# Patient Record
Sex: MALE | Race: WHITE | NOT HISPANIC OR LATINO | Employment: FULL TIME | URBAN - METROPOLITAN AREA
[De-identification: names, ages, dates, MRNs, and addresses within clinical notes are randomized per-mention and may not be internally consistent; named-entity substitution may affect disease eponyms.]

---

## 2017-01-04 DIAGNOSIS — N52.9 ERECTILE DYSFUNCTION, UNSPECIFIED ERECTILE DYSFUNCTION TYPE: Primary | ICD-10-CM

## 2017-01-04 NOTE — TELEPHONE ENCOUNTER
Patient would like to get generic Sildenafil 20mg tabs (#30 tabs = $74.50)  (#12 Viagra tabs = $453.31)

## 2017-01-05 RX ORDER — SILDENAFIL CITRATE 20 MG/1
20 TABLET ORAL
Qty: 90 TABLET | Refills: 0 | Status: SHIPPED
Start: 2017-01-05 | End: 2017-07-12

## 2017-07-12 ENCOUNTER — OFFICE VISIT (OUTPATIENT)
Dept: FAMILY MEDICINE | Facility: CLINIC | Age: 42
End: 2017-07-12
Payer: COMMERCIAL

## 2017-07-12 VITALS
SYSTOLIC BLOOD PRESSURE: 130 MMHG | DIASTOLIC BLOOD PRESSURE: 83 MMHG | TEMPERATURE: 99 F | BODY MASS INDEX: 37.73 KG/M2 | HEART RATE: 86 BPM | WEIGHT: 221 LBS | HEIGHT: 64 IN

## 2017-07-12 DIAGNOSIS — N52.9 ERECTILE DYSFUNCTION, UNSPECIFIED ERECTILE DYSFUNCTION TYPE: ICD-10-CM

## 2017-07-12 DIAGNOSIS — E66.09 NON MORBID OBESITY DUE TO EXCESS CALORIES: ICD-10-CM

## 2017-07-12 DIAGNOSIS — M77.12 LATERAL EPICONDYLITIS OF LEFT ELBOW: ICD-10-CM

## 2017-07-12 DIAGNOSIS — Z00.00 ROUTINE GENERAL MEDICAL EXAMINATION AT A HEALTH CARE FACILITY: Primary | ICD-10-CM

## 2017-07-12 DIAGNOSIS — R53.83 FATIGUE, UNSPECIFIED TYPE: ICD-10-CM

## 2017-07-12 DIAGNOSIS — G47.33 OBSTRUCTIVE SLEEP APNEA SYNDROME: ICD-10-CM

## 2017-07-12 DIAGNOSIS — R73.9 ELEVATED RANDOM BLOOD GLUCOSE LEVEL: ICD-10-CM

## 2017-07-12 LAB
ERYTHROCYTE [DISTWIDTH] IN BLOOD BY AUTOMATED COUNT: 13.2 % (ref 10–15)
HBA1C MFR BLD: 5.2 % (ref 4.3–6)
HCT VFR BLD AUTO: 43.7 % (ref 40–53)
HGB BLD-MCNC: 14.6 G/DL (ref 13.3–17.7)
MCH RBC QN AUTO: 29.8 PG (ref 26.5–33)
MCHC RBC AUTO-ENTMCNC: 33.4 G/DL (ref 31.5–36.5)
MCV RBC AUTO: 89 FL (ref 78–100)
PLATELET # BLD AUTO: 296 10E9/L (ref 150–450)
RBC # BLD AUTO: 4.9 10E12/L (ref 4.4–5.9)
WBC # BLD AUTO: 7.4 10E9/L (ref 4–11)

## 2017-07-12 PROCEDURE — 36415 COLL VENOUS BLD VENIPUNCTURE: CPT | Performed by: FAMILY MEDICINE

## 2017-07-12 PROCEDURE — 85027 COMPLETE CBC AUTOMATED: CPT | Performed by: FAMILY MEDICINE

## 2017-07-12 PROCEDURE — 99396 PREV VISIT EST AGE 40-64: CPT | Performed by: FAMILY MEDICINE

## 2017-07-12 PROCEDURE — 84443 ASSAY THYROID STIM HORMONE: CPT | Performed by: FAMILY MEDICINE

## 2017-07-12 PROCEDURE — 83036 HEMOGLOBIN GLYCOSYLATED A1C: CPT | Performed by: FAMILY MEDICINE

## 2017-07-12 RX ORDER — SILDENAFIL CITRATE 20 MG/1
20 TABLET ORAL
Qty: 90 TABLET | Refills: 0 | Status: SHIPPED | OUTPATIENT
Start: 2017-07-12 | End: 2017-07-12

## 2017-07-12 RX ORDER — SILDENAFIL CITRATE 20 MG/1
20 TABLET ORAL
Qty: 30 TABLET | Refills: 11 | Status: SHIPPED | OUTPATIENT
Start: 2017-07-12 | End: 2018-09-10

## 2017-07-12 NOTE — PATIENT INSTRUCTIONS
Weight Loss:  Exercise - the most important variable in weight loss.  Studies show that people who are able to lose weight and keep it off do 3-6 hours/week of aerobic exercise.  This is a lot if you time it but it's necessary.  In addition to intense activity, frequent short walks can help too. If you can get up and walk for 100 seconds every 30 minutes, that helps lower blood sugar. Try to find a number of activities that you enjoy so that you are not limited by circumstances. Build it into your day. For trips less than a mile, walk.  For less than 3 miles, bike. Park as far away as possible.   Spread out your calories.  Don't skip meals.  Skipping meals tells your metabolism that food is not readily available and your body should go into 'storage mode' which reduces energy and puts all available calories into fat.  Eat foods with a low glycemic index (GI).  These are foods that turn to blood sugar slower.  These prevent hunger and help eliminate wide swings in blood sugar which can cause mood and appetite swings.  Low fat proteins, nuts and whole grains often have a low GI and are good.  Simple sugars such that found in white rice, baked potatoes and white pasta/bread have a higher glycemic index and should be limited. Download a free dianne for your smart phone.  Go to bed calorie-deprived.  Consider drinking a large glass of psyllium (Metamucil) or methylcellulose (Citrucel) prior to larger meals such as dinner.  This will help fill your stomach better and longer than water which will help reduce appetite. Don't worry, it won't cause diarrhea.  Use smaller plates. It sounds silly but people who get rid of their large plates and only eat off the smaller ones lose weight!  Plan ahead. You always make better decisions ahead of time so plan and prepare your future meal(s) early including portion size. Reserve only healthy food like fruits and veggies for spur of the minute eating  Last, but not least, pay special  attention to your emotional state.  Most maladaptive behavior is done so out of an attempt to sooth one's anxieties and/or regenerate emotional energy.  Eating, like other pleasurable activities, releases dopamine in the pleasure centers of your brain.  This helps counteract the neurological effects of stress but, only briefly.  The dopamine wears off quickly, leaving one with a more empty feeling shortly after.  When you find yourself eating more than you want (or more often), ask yourself what's making you anxious, sad or angry.  Addressing these issues more directly will help minimize 'stress-eating'.     Do the exercises for the forearms and if getting worse or not getting gbeter over the next month, let me knwo and we can do physical therapy.   Preventive Health Recommendations  Male Ages 40 to 49    Yearly exam:             See your health care provider every year in order to  o   Review health changes.   o   Discuss preventive care.    o   Review your medicines if your doctor has prescribed any.    You should be tested each year for STDs (sexually transmitted diseases) if you re at risk.     Have a cholesterol test every 5 years.     Have a colonoscopy (test for colon cancer) if someone in your family has had colon cancer or polyps before age 50.     After age 45, have a diabetes test (fasting glucose). If you are at risk for diabetes, you should have this test every 3 years.      Talk with your health care provider about whether or not a prostate cancer screening test (PSA) is right for you.    Shots: Get a flu shot each year. Get a tetanus shot every 10 years.     Nutrition:    Eat at least 5 servings of fruits and vegetables daily.     Eat whole-grain bread, whole-wheat pasta and brown rice instead of white grains and rice.     Talk to your provider about Calcium and Vitamin D.     Lifestyle    Exercise for at least 150 minutes a week (30 minutes a day, 5 days a week). This will help you control your  weight and prevent disease.     Limit alcohol to one drink per day.     No smoking.     Wear sunscreen to prevent skin cancer.     See your dentist every six months for an exam and cleaning.

## 2017-07-12 NOTE — MR AVS SNAPSHOT
After Visit Summary   7/12/2017    Paco Yang    MRN: [id number]           Patient Information     Date Of Birth          1975        Visit Information        Provider Department      7/12/2017 4:00 PM Ruperto Dyer MD [facility]        Today's Diagnoses     Routine general medical examination at a health care facility    -  1    Erectile dysfunction, unspecified erectile dysfunction type        Elevated random blood glucose level        Obstructive sleep apnea syndrome        Non morbid obesity due to excess calories        Fatigue, unspecified type        Lateral epicondylitis of left elbow          Care Instructions    Weight Loss:  Exercise - the most important variable in weight loss.  Studies show that people who are able to lose weight and keep it off do 3-6 hours/week of aerobic exercise.  This is a lot if you time it but it's necessary.  In addition to intense activity, frequent short walks can help too. If you can get up and walk for 100 seconds every 30 minutes, that helps lower blood sugar. Try to find a number of activities that you enjoy so that you are not limited by circumstances. Build it into your day. For trips less than a mile, walk.  For less than 3 miles, bike. Park as far away as possible.   Spread out your calories.  Don't skip meals.  Skipping meals tells your metabolism that food is not readily available and your body should go into 'storage mode' which reduces energy and puts all available calories into fat.  Eat foods with a low glycemic index (GI).  These are foods that turn to blood sugar slower.  These prevent hunger and help eliminate wide swings in blood sugar which can cause mood and appetite swings.  Low fat proteins, nuts and whole grains often have a low GI and are good.  Simple sugars such that found in white rice, baked potatoes and white pasta/bread have a higher glycemic index and should be limited. Download a free dianne for your  smart phone.  Go to bed calorie-deprived.  Consider drinking a large glass of psyllium (Metamucil) or methylcellulose (Citrucel) prior to larger meals such as dinner.  This will help fill your stomach better and longer than water which will help reduce appetite. Don't worry, it won't cause diarrhea.  Use smaller plates. It sounds silly but people who get rid of their large plates and only eat off the smaller ones lose weight!  Plan ahead. You always make better decisions ahead of time so plan and prepare your future meal(s) early including portion size. Reserve only healthy food like fruits and veggies for spur of the minute eating  Last, but not least, pay special attention to your emotional state.  Most maladaptive behavior is done so out of an attempt to sooth one's anxieties and/or regenerate emotional energy.  Eating, like other pleasurable activities, releases dopamine in the pleasure centers of your brain.  This helps counteract the neurological effects of stress but, only briefly.  The dopamine wears off quickly, leaving one with a more empty feeling shortly after.  When you find yourself eating more than you want (or more often), ask yourself what's making you anxious, sad or angry.  Addressing these issues more directly will help minimize 'stress-eating'.     Do the exercises for the forearms and if getting worse or not getting gbeter over the next month, let me knwo and we can do physical therapy.   Preventive Health Recommendations  Male Ages 40 to 49    Yearly exam:             See your health care provider every year in order to  o   Review health changes.   o   Discuss preventive care.    o   Review your medicines if your doctor has prescribed any.    You should be tested each year for STDs (sexually transmitted diseases) if you re at risk.     Have a cholesterol test every 5 years.     Have a colonoscopy (test for colon cancer) if someone in your family has had colon cancer or polyps before age 50.      After age 45, have a diabetes test (fasting glucose). If you are at risk for diabetes, you should have this test every 3 years.      Talk with your health care provider about whether or not a prostate cancer screening test (PSA) is right for you.    Shots: Get a flu shot each year. Get a tetanus shot every 10 years.     Nutrition:    Eat at least 5 servings of fruits and vegetables daily.     Eat whole-grain bread, whole-wheat pasta and brown rice instead of white grains and rice.     Talk to your provider about Calcium and Vitamin D.     Lifestyle    Exercise for at least 150 minutes a week (30 minutes a day, 5 days a week). This will help you control your weight and prevent disease.     Limit alcohol to one drink per day.     No smoking.     Wear sunscreen to prevent skin cancer.     See your dentist every six months for an exam and cleaning.              Follow-ups after your visit        Additional Services     SLEEP EVALUATION & MANAGEMENT REFERRAL - ADULT       Fatigue.  Wants to know if setting appropriate. Please be aware that coverage of these services is subject to the terms and limitations of your health insurance plan.  Call member services at your health plan with any benefit or coverage questions.      Please bring the following to your appointment:    >>   List of current medications   >>   This referral request   >>   Any documents/labs given to you for this referral    [facility]  Ph [phone] (Age 2 and up)                  Future tests that were ordered for you today     Open Future Orders        Priority Expected Expires Ordered    SLEEP EVALUATION & MANAGEMENT REFERRAL - ADULT Routine  7/12/2018 7/12/2017    Lipid panel reflex to direct LDL Routine 7/13/2017 7/12/2018 7/12/2017            Who to contact     Normal or non-critical lab and imaging results will be communicated to you by MyChart, letter or phone within 4 business days after the clinic has received the  "results. If you do not hear from us within 7 days, please contact the clinic through [employer] or phone. If you have a critical or abnormal lab result, we will notify you by phone as soon as possible.  Submit refill requests through [employer] or call your pharmacy and they will forward the refill request to us. Please allow 3 business days for your refill to be completed.          If you need to speak with a  for additional information , please call: [phone]           Additional Information About Your Visit        [employer] Information     [employer] gives you secure access to your electronic health record. If you see a primary care provider, you can also send messages to your care team and make appointments. If you have questions, please call your primary care clinic.  If you do not have a primary care provider, please call [phone] and they will assist you.        Care EveryWhere ID     This is your Care EveryWhere ID. This could be used by other organizations to access your [facility] medical records  [id number]        Your Vitals Were     Pulse Temperature Height BMI (Body Mass Index)          86 99  F (37.2  C) (Tympanic) 5' 4\" (1.626 m) 37.93 kg/m2         Blood Pressure from Last 3 Encounters:   07/12/17 130/83   09/26/16 130/86   12/11/15 131/87    Weight from Last 3 Encounters:   07/12/17 221 lb (100.2 kg)   09/26/16 213 lb (96.6 kg)   12/11/15 214 lb 9.6 oz (97.3 kg)              We Performed the Following     CBC with platelets     Hemoglobin A1c     OFFICE/OUTPT VISIT,EST,LEVL III     TSH          Today's Medication Changes          These changes are accurate as of: 7/12/17  4:44 PM.  If you have any questions, ask your nurse or doctor.               Start taking these medicines.        Dose/Directions    sildenafil 20 MG tablet   Commonly known as:  REVATIO/VIAGRA   Used for:  Erectile dysfunction, unspecified erectile dysfunction type   Started by:  Ruperto Dyer MD        Dose:  " 20 mg   Take 1 tablet (20 mg) by mouth once as needed 1-2 as needed. Never use with nitroglycerin, terazosin or doxazosin.   Quantity:  30 tablet   Refills:  11            Where to get your medicines      These medications were sent to [facility] [place] [id number] [facility]  [address] [facility] [address]     Phone:  [phone]     sildenafil 20 MG tablet                Primary Care Provider Office Phone # Fax #    Ruperto Dyer [phone]       [address] [address]        Equal Access to Services     SUNDEEP ARCHULEAT : Hadii aad ku hadasho Soomaali, waaxda luqadaha, qaybta kaalmada adeegyada, danica valero . So [facility] [phone].    ATENCIÓN: Si habla español, tiene a galan disposición servicios gratuitos de asistencia lingüística. Llame al [phone].    We comply with applicable federal civil rights laws and [place] laws. We do not discriminate on the basis of race, color, national origin, age, disability sex, sexual orientation or gender identity.            Thank you!     Thank you for choosing [facility]  for your care. Our goal is always to provide you with excellent care. Hearing back from our patients is one way we can continue to improve our services. Please take a few minutes to complete the written survey that you may receive in the mail after your visit with us. Thank you!             Your Updated Medication List - Protect others around you: Learn how to safely use, store and throw away your medicines at www.disposemymeds.org.          This list is accurate as of: 7/12/17  4:44 PM.  Always use your most recent med list.                   Brand Name Dispense Instructions for use Diagnosis    ibuprofen 800 MG tablet    ADVIL/MOTRIN     Take 800 mg by mouth every 6 hours as needed for moderate pain        order for DME     1 Device    Equipment being ordered:  cpap mask and tubing. Replacement.    Unspecified sleep apnea       sildenafil 20 MG tablet    REVATIO/VIAGRA    30 tablet    Take 1 tablet (20 mg) by mouth once as needed 1-2 as needed. Never use with nitroglycerin, terazosin or doxazosin.    Erectile dysfunction, unspecified erectile dysfunction type

## 2017-07-12 NOTE — PROGRESS NOTES
SUBJECTIVE:   CC: Paco Yang is an 41 year old male who presents for preventative health visit.     Healthy Habits:    Do you get at least three servings of calcium containing foods daily (dairy, green leafy vegetables, etc.)? yes    Amount of exercise or daily activities, outside of work: 0-2 day(s) per week    Problems taking medications regularly No    Medication side effects: No    Have you had an eye exam in the past two years? no    Do you see a dentist twice per year? no    Do you have sleep apnea, excessive snoring or daytime drowsiness?yes        PROBLEMS TO ADD ON...  1. Routine general medical examination at a health care facility    2. Erectile dysfunction, unspecified erectile dysfunction type    3. Elevated random blood glucose level    4. Obstructive sleep apnea syndrome    5. Non morbid obesity due to excess calories    6. Fatigue, unspecified type    7. Lateral epicondylitis of left elbow         Today's PHQ-2 Score:   PHQ-2 ( 1999 Pfizer) 7/12/2017 9/26/2016   Q1: Little interest or pleasure in doing things 0 0   Q2: Feeling down, depressed or hopeless 0 0   PHQ-2 Score 0 0       Abuse: Current or Past(Physical, Sexual or Emotional)- No  Do you feel safe in your environment - Yes    Social History   Substance Use Topics     Smoking status: Never Smoker     Smokeless tobacco: Former User     Types: Chew     Quit date: 3/3/2004      Comment: Chews 1 tin q 2     Alcohol use Yes      Comment: 10 drinks per week     The patient does not drink >3 drinks per day nor >7 drinks per week.    Last PSA: No results found for: PSA    Reviewed orders with patient. Reviewed health maintenance and updated orders accordingly - Yes      Reviewed and updated as needed this visit by clinical staff  Tobacco  Allergies  Med Hx  Surg Hx  Fam Hx  Soc Hx        Reviewed and updated as needed this visit by Provider        No results found for: A1C   Lab Results   Component Value Date     12/11/2015  "    Left arm pain with actiivty. Does work as a . Lots of gripping and graspign.      Tired a lot.  Sleeping more.  Using cpap.     ROS:   Constitutional, neuro, EMT, endocrine, pulmonary, cardiac, gastrointestinal, genitourinary, musculoskeletal, integument and psychiatric systems are otherwise negative.     OBJECTIVE:   /83  Pulse 86  Temp 99  F (37.2  C) (Tympanic)  Ht 5' 4\" (1.626 m)  Wt 221 lb (100.2 kg)  BMI 37.93 kg/m2  EXAM:  GENERAL: healthy, alert and no distress  NECK: no adenopathy, no asymmetry, masses, or scars and thyroid normal to palpation  RESP: lungs clear to auscultation - no rales, rhonchi or wheezes  CV: regular rate and rhythm, normal S1 S2, no S3 or S4, no murmur, click or rub, no peripheral edema and peripheral pulses strong  ABDOMEN: soft, nontender, no hepatosplenomegaly, no masses and bowel sounds normal  MS: no gross musculoskeletal defects noted, no edema  NEURO: Normal strength and tone, mentation intact and speech normal  PSYCH: mentation appears normal, affect normal/bright    ASSESSMENT/PLAN:       ICD-10-CM    1. Routine general medical examination at a health care facility Z00.00 Lipid panel reflex to direct LDL   2. Erectile dysfunction, unspecified erectile dysfunction type N52.9 sildenafil (REVATIO/VIAGRA) 20 MG tablet     DISCONTINUED: sildenafil (REVATIO/VIAGRA) 20 MG tablet   3. Elevated random blood glucose level R73.09    4. Obstructive sleep apnea syndrome G47.33 SLEEP EVALUATION & MANAGEMENT REFERRAL - ADULT   5. Non morbid obesity due to excess calories E66.09 Hemoglobin A1c     TSH     CBC with platelets     SLEEP EVALUATION & MANAGEMENT REFERRAL - ADULT   6. Fatigue, unspecified type R53.83    7. Lateral epicondylitis of left elbow M77.12        COUNSELING:  Reviewed preventive health counseling, as reflected in patient instructions    BP Screening:   Last 3 BP Readings:    BP Readings from Last 3 Encounters:   07/12/17 130/83   09/26/16 130/86 " "  12/11/15 131/87       The following was recommended to the patient:  Re-screen BP within a year and recommended lifestyle modifications     reports that he has never smoked. He quit smokeless tobacco use about 13 years ago. His smokeless tobacco use included Chew.    Estimated body mass index is 37.93 kg/(m^2) as calculated from the following:    Height as of this encounter: 5' 4\" (1.626 m).    Weight as of this encounter: 221 lb (100.2 kg).   Weight management plan: Discussed healthy diet and exercise guidelines and patient will follow up in 12 months in clinic to re-evaluate.    Counseling Resources:  ATP IV Guidelines  Pooled Cohorts Equation Calculator  FRAX Risk Assessment  ICSI Preventive Guidelines  Dietary Guidelines for Americans, 2010  USDA's MyPlate  ASA Prophylaxis  Lung CA Screening    Ruperto Dyer MD  [facility]  "

## 2017-07-12 NOTE — NURSING NOTE
"Initial /83  Pulse 86  Temp 99  F (37.2  C) (Tympanic)  Ht 5' 4\" (1.626 m)  Wt 221 lb (100.2 kg)  BMI 37.93 kg/m2 Estimated body mass index is 37.93 kg/(m^2) as calculated from the following:    Height as of this encounter: 5' 4\" (1.626 m).    Weight as of this encounter: 221 lb (100.2 kg). .    Trixie Curry CMA ([facility])  "

## 2017-07-13 LAB — TSH SERPL DL<=0.05 MIU/L-ACNC: 1.44 MU/L (ref 0.4–4)

## 2017-07-14 NOTE — PROGRESS NOTES
Mr. Yang,    Your A1c was within the goal range for your. A normal level is below 6.     The goal for diabetics with other complications is less than 8. This suggests that you do not have diabetes.    Your TSH indicates that your thyroid function is currently in balance.  No additional testing is necessary for a year or unless you develop symptoms of over or underactive thyroid.    Your blood count was normal with no evidence of anemia or white count elevation.      Please contact the clinic if you have additional questions.  Thank you.    Sincerely,    Jhonny Dyer MD

## 2018-05-23 ENCOUNTER — OFFICE VISIT (OUTPATIENT)
Dept: FAMILY MEDICINE | Facility: CLINIC | Age: 43
End: 2018-05-23
Payer: COMMERCIAL

## 2018-05-23 VITALS
RESPIRATION RATE: 16 BRPM | SYSTOLIC BLOOD PRESSURE: 122 MMHG | HEART RATE: 82 BPM | TEMPERATURE: 98.3 F | BODY MASS INDEX: 35.2 KG/M2 | HEIGHT: 64 IN | DIASTOLIC BLOOD PRESSURE: 76 MMHG | WEIGHT: 206.2 LBS

## 2018-05-23 DIAGNOSIS — G44.209 TENSION HEADACHE: ICD-10-CM

## 2018-05-23 DIAGNOSIS — N52.9 ERECTILE DYSFUNCTION, UNSPECIFIED ERECTILE DYSFUNCTION TYPE: ICD-10-CM

## 2018-05-23 DIAGNOSIS — M79.672 PAIN IN BOTH FEET: Primary | ICD-10-CM

## 2018-05-23 DIAGNOSIS — M79.671 PAIN IN BOTH FEET: Primary | ICD-10-CM

## 2018-05-23 DIAGNOSIS — G56.03 BILATERAL CARPAL TUNNEL SYNDROME: ICD-10-CM

## 2018-05-23 DIAGNOSIS — G47.9 SLEEP DISTURBANCE: ICD-10-CM

## 2018-05-23 PROCEDURE — 99214 OFFICE O/P EST MOD 30 MIN: CPT | Performed by: NURSE PRACTITIONER

## 2018-05-23 ASSESSMENT — ENCOUNTER SYMPTOMS
NUMBNESS: 0
SLEEP DISTURBANCE: 1
PALPITATIONS: 0
TREMORS: 0
ABDOMINAL PAIN: 0
SHORTNESS OF BREATH: 0
HEADACHES: 1
COUGH: 0
DIZZINESS: 0
SINUS PRESSURE: 0
WHEEZING: 0
LIGHT-HEADEDNESS: 0
JOINT SWELLING: 0
FREQUENCY: 0
CONSTIPATION: 0
SORE THROAT: 0
DYSURIA: 0
ARTHRALGIAS: 0
FATIGUE: 0
RHINORRHEA: 0
DIARRHEA: 0

## 2018-05-23 ASSESSMENT — PAIN SCALES - GENERAL: PAINLEVEL: NO PAIN (1)

## 2018-05-23 NOTE — PROGRESS NOTES
SUBJECTIVE:   Paco Yang is a 42 year old male who presents to clinic today for the following health issues:      Headaches      Duration: 2 weeks-thinks stress related. Children are acting out because he got back with an old girlfriend after his divorce. He is fighting with his ex wife. Having a hard time sleeping. Losing weight due to stress and lack of appetite.     Description  Location: front and back of head   Character: throbbing pain  Frequency:  4 headaches over the past 2 weeks  Duration:  3-4 hours    Intensity:  severe    Accompanying signs and symptoms:    Precipitating or Alleviating factors:  Nausea/vomiting: no  Dizziness: occasionally  Weakness or numbness: Hands go numb when he uses them a lot. This has been happening for years.  Visual changes: tunnel vision once  Fever: no   Sinus or URI symptoms no     History  Head trauma: no   Family history of migraines: no   Previous tests for headaches: no   Neurologist evaluations: no   Able to do daily activities when headache present: no   Wake with headaches: no   Daily pain medication use: no   Any changes in: relationship and family     Precipitating or Alleviating factors (light/sound/sleep/caffeine): sleep helps. Has not been sensitive to light and sound. Hasn't noticed if caffeine helps.    Therapies tried and outcome: Ibuprofen (Advil, Motrin)    Outcome - dulled pain  Frequent/daily pain medication use: no     Pain in bottom of left heel for a few weeks. Also has pain in left foot. Walking is difficult. Pain worsens in the morning.      Problem list and histories reviewed & adjusted, as indicated.  Additional history: as documented    Current Outpatient Prescriptions   Medication Sig Dispense Refill     ibuprofen (ADVIL,MOTRIN) 800 MG tablet Take 800 mg by mouth every 6 hours as needed for moderate pain       ORDER FOR DME Equipment being ordered: cpap mask and tubing. Replacement. (Patient not taking: Reported on 5/23/2018) 1 Device 0      sildenafil (REVATIO/VIAGRA) 20 MG tablet Take 1 tablet (20 mg) by mouth once as needed 1-2 as needed. Never use with nitroglycerin, terazosin or doxazosin. 30 tablet 11     Allergies   Allergen Reactions     Penicillins      PENICILLIN       Reviewed and updated as needed this visit by clinical staff  Tobacco  Allergies  Meds  Problems  Med Hx  Surg Hx  Fam Hx  Soc Hx        Reviewed and updated as needed this visit by Provider  Problems          Has had 4 headaches in the last 2 weeks They are usually to top of head, will take a nap and will get better. No vision changes, did have tunnel vision once, but headache was worse at that time. Did have migraines as a child and had tunnel vision but outgrew them. No head trauma or concussions. No trouble moving arms, legs, eating, walking of swallowing. Has lost weight over the last 2 weeks. Appetite is gone and has not been eating as much fast food as had in the past. No dizziness or light head. Will take ibuprofen and that does help it. Wakes up more frequently than had in the past. Is not tired during the day. Feels like is more alert since started taking Viagra.     Hands get tingling at times. Does a lot of work with hands. Has had that for the last 3-4 years. Has recently lost weight and has noticed that it is some better.  Usually gets better over the weekend and then when returns to work tingling will return.  Does not usually wake in the middle of the night.    Feet have been hurting. Every 2-3 months one of feet will hurt so bad will like it is broken. Noticed that the bottom of left heel hurts when wakes in the AM. Once gets up and going tin the AM will be ok. No new injury to foot that is aware of.     Uses Viagra as needed to help with erectile dysfunction.  Has been taking it no side effects.  No pain in chest, no increased shortness of breath.  Works well.  Does not have erections lasting longer than 4 hours.    ROS:  Review of Systems  "  Constitutional: Negative for fatigue.   HENT: Negative for congestion, ear pain, rhinorrhea, sinus pressure and sore throat.    Respiratory: Negative for cough, shortness of breath and wheezing.    Cardiovascular: Negative for chest pain, palpitations and leg swelling.   Gastrointestinal: Negative for abdominal pain, constipation and diarrhea.   Genitourinary: Negative for dysuria and frequency.   Musculoskeletal: Negative for arthralgias and joint swelling.   Skin: Negative for rash.        Pain to both feet, left is worse.    Neurological: Positive for headaches. Negative for dizziness, tremors, light-headedness and numbness.   Psychiatric/Behavioral: Positive for sleep disturbance (at times).        Increased stress           OBJECTIVE:     /76 (BP Location: Left arm, Patient Position: Sitting, Cuff Size: Adult Large)  Pulse 82  Temp 98.3  F (36.8  C) (Tympanic)  Resp 16  Ht 5' 4.25\" (1.632 m)  Wt 206 lb 3.2 oz (93.5 kg)  BMI 35.12 kg/m2  Body mass index is 35.12 kg/(m^2).  Physical Exam   Constitutional: He appears well-developed and well-nourished.   HENT:   Head: Normocephalic and atraumatic.   Right Ear: Tympanic membrane and external ear normal.   Left Ear: Tympanic membrane and external ear normal.   Nose: No mucosal edema or rhinorrhea.   Eyes: EOM are normal. Pupils are equal, round, and reactive to light.   Cardiovascular: Normal rate, regular rhythm and normal heart sounds.    Pulmonary/Chest: Effort normal and breath sounds normal.   Abdominal: Soft. Bowel sounds are normal.   Musculoskeletal:        Feet:    Neurological: He is alert. He has normal strength.   Skin: Skin is warm and dry.   Psychiatric: He has a normal mood and affect.       ASSESSMENT/PLAN:   1. Pain in both feet  Plan to refer to podiatry.  Encouraged to wear supportive shoes at all times.  - PODIATRY/FOOT & ANKLE SURGERY REFERRAL    2. Bilateral carpal tunnel syndrome  Discussed treatment interventions.  Declines " wanting to wear wrist braces.  Declines wanting to see Orth O at that this time.  We will notify if changes mind.    3. Tension headache  Push fluids  Try and decrease stress  Get plenty of rest  Take over the counter tylenol or ibuprofen as needed  Educated regarding warning signs to watch for.     4. Sleep disturbance  Discussed treatment options.  Encouraged to refrain from drinking caffeine in the afternoon.  Encouraged to have nightly bedtime routine.  Encouraged no electronics prior to bed.  May use over-the-counter melatonin if needed.    5. Erectile dysfunction, unspecified erectile dysfunction type  We will refill when needed.      BROOKE Dior [facility]

## 2018-05-23 NOTE — MR AVS SNAPSHOT
After Visit Summary   5/23/2018    Paco Yang    MRN: [id number]           Patient Information     Date Of Birth          1975        Visit Information        Provider Department      5/23/2018 1:00 PM Grace Carroll APRN CNP [facility]        Today's Diagnoses     Pain in both feet    -  1       Follow-ups after your visit        Additional Services     PODIATRY/FOOT & ANKLE SURGERY REFERRAL       Your provider has referred you to: [facility]: [facility] [phone]   [email or web address]    Please be aware that coverage of these services is subject to the terms and limitations of your health insurance plan.  Call member services at your health plan with any benefit or coverage questions.      Please bring the following to your appointment:  >>   Any x-rays, CTs or MRIs which have been performed.  Contact the facility where they were done to arrange for  prior to your scheduled appointment.    >>   List of current medications   >>   This referral request   >>   Any documents/labs given to you for this referral                  Who to contact     Normal or non-critical lab and imaging results will be communicated to you by [employer]VC[employer]art, letter or phone within 4 business days after the clinic has received the results. If you do not hear from us within 7 days, please contact the clinic through [employer] or phone. If you have a critical or abnormal lab result, we will notify you by phone as soon as possible.  Submit refill requests through [employer] [facility] or call your pharmacy and they will forward the refill request to us. Please allow 3 business days for your refill to be completed.          If you need to speak with a  for additional information , please call: [phone]           Additional Information About Your Visit        [employer] [facility] Information     [employer] gives you secure access to your electronic health  "record. If you see a primary care provider, you can also send messages to your care team and make appointments. If you have questions, please call your primary care clinic.  If you do not have a primary care provider, please call [phone] and they will assist you.        Care EveryWhere ID     This is your Care EveryWhere ID. This could be used by other organizations to access your [facility] medical records  [id number]        Your Vitals Were     Pulse Temperature Respirations Height BMI (Body Mass Index)       82 98.3  F (36.8  C) (Tympanic) 16 5' 4.25\" (1.632 m) 35.12 kg/m2        Blood Pressure from Last 3 Encounters:   05/23/18 122/76   07/12/17 130/83   09/26/16 130/86    Weight from Last 3 Encounters:   05/23/18 206 lb 3.2 oz (93.5 kg)   07/12/17 221 lb (100.2 kg)   09/26/16 213 lb (96.6 kg)              We Performed the Following     PODIATRY/FOOT & ANKLE SURGERY REFERRAL        Primary Care Provider Office Phone # Fax #    BROOKE Robles [phone]       [facility] [address] [place] [address]        Equal Access to Services     DEVENDRA ARCHULETA : Hadii aad ku hadasho Soomaali, waaxda luqadaha, qaybta kaalmada adeegyada, waxay idiin hayhannahn verena madden. So [facility] [phone].    ATENCIÓN: Si habla español, tiene a galan disposición servicios gratuitos de asistencia lingüística. Llame al [phone].    We comply with applicable federal civil rights laws and [place] laws. We do not discriminate on the basis of race, color, national origin, age, disability, sex, sexual orientation, or gender identity.            Thank you!     Thank you for choosing [facility]  for your care. Our goal is always to provide you with excellent care. Hearing back from our patients is one way we can continue to improve our services. Please take a few minutes to complete the written survey that you may receive in the mail after your visit with us. " Thank you!             Your Updated Medication List - Protect others around you: Learn how to safely use, store and throw away your medicines at www.disposemymeds.org.          This list is accurate as of 5/23/18  1:31 PM.  Always use your most recent med list.                   Brand Name Dispense Instructions for use Diagnosis    ibuprofen 800 MG tablet    ADVIL/MOTRIN     Take 800 mg by mouth every 6 hours as needed for moderate pain        order for DME     1 Device    Equipment being ordered: cpap mask and tubing. Replacement.    Unspecified sleep apnea       sildenafil 20 MG tablet    REVATIO    30 tablet    Take 1 tablet (20 mg) by mouth once as needed 1-2 as needed. Never use with nitroglycerin, terazosin or doxazosin.    Erectile dysfunction, unspecified erectile dysfunction type

## 2018-09-10 DIAGNOSIS — N52.9 ERECTILE DYSFUNCTION, UNSPECIFIED ERECTILE DYSFUNCTION TYPE: ICD-10-CM

## 2018-09-11 RX ORDER — SILDENAFIL CITRATE 20 MG/1
20 TABLET ORAL
Qty: 30 TABLET | Refills: 7 | Status: SHIPPED | OUTPATIENT
Start: 2018-09-11

## 2018-09-11 NOTE — TELEPHONE ENCOUNTER
"Requested Prescriptions   Pending Prescriptions Disp Refills     sildenafil (REVATIO) 20 MG tablet 30 tablet 11    Last Written Prescription Date:  07/12/2017 #30 x 11  Last filled - not provided  Last office visit: 5/23/2018 BAYRON Carroll   Future Office Visit:  None   Sig: Take 1 tablet (20 mg) by mouth once as needed 1-2 as needed. Never use with nitroglycerin, terazosin or doxazosin.    Erectile Dysfuction Protocol Passed    9/10/2018  5:44 PM       Passed - Absence of nitrates on medication list       Passed - Absence of Alpha Blockers on Med list       Passed - Recent (12 mo) or future (30 days) visit within the authorizing provider's specialty    Patient had office visit in the last 12 months or has a visit in the next 30 days with authorizing provider or within the authorizing provider's specialty.  See \"Patient Info\" tab in inbasket, or \"Choose Columns\" in Meds & Orders section of the refill encounter.           Passed - Patient is age 18 or older          "

## 2018-09-11 NOTE — TELEPHONE ENCOUNTER
Prescription approved per [facility] Refill Protocol.  Rafaela HOBSON RN

## 2020-02-10 ENCOUNTER — HEALTH MAINTENANCE LETTER (OUTPATIENT)
Age: 45
End: 2020-02-10

## 2020-11-16 ENCOUNTER — HEALTH MAINTENANCE LETTER (OUTPATIENT)
Age: 45
End: 2020-11-16

## 2021-04-03 ENCOUNTER — HEALTH MAINTENANCE LETTER (OUTPATIENT)
Age: 46
End: 2021-04-03

## 2021-09-18 ENCOUNTER — HEALTH MAINTENANCE LETTER (OUTPATIENT)
Age: 46
End: 2021-09-18

## 2022-04-24 ENCOUNTER — HEALTH MAINTENANCE LETTER (OUTPATIENT)
Age: 47
End: 2022-04-24

## 2022-11-19 ENCOUNTER — HEALTH MAINTENANCE LETTER (OUTPATIENT)
Age: 47
End: 2022-11-19

## 2023-06-01 ENCOUNTER — HEALTH MAINTENANCE LETTER (OUTPATIENT)
Age: 48
End: 2023-06-01

## 2024-09-06 ENCOUNTER — OFFICE VISIT (OUTPATIENT)
Dept: FAMILY MEDICINE | Facility: CLINIC | Age: 49
End: 2024-09-06
Payer: COMMERCIAL

## 2024-09-06 ENCOUNTER — ANCILLARY PROCEDURE (OUTPATIENT)
Dept: GENERAL RADIOLOGY | Facility: CLINIC | Age: 49
End: 2024-09-06
Attending: STUDENT IN AN ORGANIZED HEALTH CARE EDUCATION/TRAINING PROGRAM
Payer: COMMERCIAL

## 2024-09-06 VITALS
HEIGHT: 66 IN | HEART RATE: 74 BPM | OXYGEN SATURATION: 98 % | DIASTOLIC BLOOD PRESSURE: 84 MMHG | BODY MASS INDEX: 37.93 KG/M2 | WEIGHT: 236 LBS | SYSTOLIC BLOOD PRESSURE: 134 MMHG | RESPIRATION RATE: 18 BRPM

## 2024-09-06 DIAGNOSIS — Z00.00 ROUTINE GENERAL MEDICAL EXAMINATION AT A HEALTH CARE FACILITY: Primary | ICD-10-CM

## 2024-09-06 DIAGNOSIS — E66.01 CLASS 2 SEVERE OBESITY DUE TO EXCESS CALORIES WITH SERIOUS COMORBIDITY AND BODY MASS INDEX (BMI) OF 38.0 TO 38.9 IN ADULT (H): ICD-10-CM

## 2024-09-06 DIAGNOSIS — M25.571 PAIN IN JOINT, ANKLE AND FOOT, RIGHT: ICD-10-CM

## 2024-09-06 DIAGNOSIS — E66.812 CLASS 2 SEVERE OBESITY DUE TO EXCESS CALORIES WITH SERIOUS COMORBIDITY AND BODY MASS INDEX (BMI) OF 38.0 TO 38.9 IN ADULT (H): ICD-10-CM

## 2024-09-06 DIAGNOSIS — F17.200 NICOTINE DEPENDENCE, UNCOMPLICATED, UNSPECIFIED NICOTINE PRODUCT TYPE: ICD-10-CM

## 2024-09-06 DIAGNOSIS — Z11.4 ENCOUNTER FOR SCREENING FOR HIV: ICD-10-CM

## 2024-09-06 DIAGNOSIS — Z11.59 ENCOUNTER FOR HEPATITIS C SCREENING TEST FOR LOW RISK PATIENT: ICD-10-CM

## 2024-09-06 DIAGNOSIS — L30.9 DERMATITIS: ICD-10-CM

## 2024-09-06 DIAGNOSIS — Z12.11 SCREEN FOR COLON CANCER: ICD-10-CM

## 2024-09-06 DIAGNOSIS — Z13.220 SCREENING FOR LIPID DISORDERS: ICD-10-CM

## 2024-09-06 DIAGNOSIS — Z13.1 SCREENING FOR DIABETES MELLITUS: ICD-10-CM

## 2024-09-06 LAB
ALBUMIN SERPL BCG-MCNC: 4.1 G/DL (ref 3.5–5.2)
ALP SERPL-CCNC: 55 U/L (ref 40–150)
ALT SERPL W P-5'-P-CCNC: 50 U/L (ref 0–70)
ANION GAP SERPL CALCULATED.3IONS-SCNC: 9 MMOL/L (ref 7–15)
AST SERPL W P-5'-P-CCNC: 36 U/L (ref 0–45)
BASOPHILS # BLD AUTO: 0 10E3/UL (ref 0–0.2)
BASOPHILS NFR BLD AUTO: 1 %
BILIRUB SERPL-MCNC: 0.3 MG/DL
BUN SERPL-MCNC: 10.6 MG/DL (ref 6–20)
CALCIUM SERPL-MCNC: 8.9 MG/DL (ref 8.8–10.4)
CHLORIDE SERPL-SCNC: 103 MMOL/L (ref 98–107)
CHOLEST SERPL-MCNC: 194 MG/DL
CREAT SERPL-MCNC: 0.86 MG/DL (ref 0.67–1.17)
CRP SERPL-MCNC: 17.33 MG/L
EGFRCR SERPLBLD CKD-EPI 2021: >90 ML/MIN/1.73M2
EOSINOPHIL # BLD AUTO: 0.1 10E3/UL (ref 0–0.7)
EOSINOPHIL NFR BLD AUTO: 2 %
ERYTHROCYTE [DISTWIDTH] IN BLOOD BY AUTOMATED COUNT: 13 % (ref 10–15)
ERYTHROCYTE [SEDIMENTATION RATE] IN BLOOD BY WESTERGREN METHOD: 15 MM/HR (ref 0–15)
FASTING STATUS PATIENT QL REPORTED: NO
FASTING STATUS PATIENT QL REPORTED: NO
GLUCOSE SERPL-MCNC: 112 MG/DL (ref 70–99)
HBA1C MFR BLD: 5.9 % (ref 0–5.6)
HCO3 SERPL-SCNC: 26 MMOL/L (ref 22–29)
HCT VFR BLD AUTO: 40.9 % (ref 40–53)
HDLC SERPL-MCNC: 47 MG/DL
HGB BLD-MCNC: 13.7 G/DL (ref 13.3–17.7)
IMM GRANULOCYTES # BLD: 0 10E3/UL
IMM GRANULOCYTES NFR BLD: 0 %
LDLC SERPL CALC-MCNC: 114 MG/DL
LYMPHOCYTES # BLD AUTO: 1.5 10E3/UL (ref 0.8–5.3)
LYMPHOCYTES NFR BLD AUTO: 23 %
MCH RBC QN AUTO: 29.1 PG (ref 26.5–33)
MCHC RBC AUTO-ENTMCNC: 33.5 G/DL (ref 31.5–36.5)
MCV RBC AUTO: 87 FL (ref 78–100)
MONOCYTES # BLD AUTO: 0.6 10E3/UL (ref 0–1.3)
MONOCYTES NFR BLD AUTO: 8 %
NEUTROPHILS # BLD AUTO: 4.5 10E3/UL (ref 1.6–8.3)
NEUTROPHILS NFR BLD AUTO: 67 %
NONHDLC SERPL-MCNC: 147 MG/DL
PLATELET # BLD AUTO: 294 10E3/UL (ref 150–450)
POTASSIUM SERPL-SCNC: 4 MMOL/L (ref 3.4–5.3)
PROT SERPL-MCNC: 7.3 G/DL (ref 6.4–8.3)
RBC # BLD AUTO: 4.7 10E6/UL (ref 4.4–5.9)
SODIUM SERPL-SCNC: 138 MMOL/L (ref 135–145)
TRIGL SERPL-MCNC: 164 MG/DL
URATE SERPL-MCNC: 6.4 MG/DL (ref 3.4–7)
WBC # BLD AUTO: 6.8 10E3/UL (ref 4–11)

## 2024-09-06 PROCEDURE — 99386 PREV VISIT NEW AGE 40-64: CPT | Mod: 25 | Performed by: STUDENT IN AN ORGANIZED HEALTH CARE EDUCATION/TRAINING PROGRAM

## 2024-09-06 PROCEDURE — 85025 COMPLETE CBC W/AUTO DIFF WBC: CPT | Performed by: STUDENT IN AN ORGANIZED HEALTH CARE EDUCATION/TRAINING PROGRAM

## 2024-09-06 PROCEDURE — 83036 HEMOGLOBIN GLYCOSYLATED A1C: CPT | Performed by: STUDENT IN AN ORGANIZED HEALTH CARE EDUCATION/TRAINING PROGRAM

## 2024-09-06 PROCEDURE — 80053 COMPREHEN METABOLIC PANEL: CPT | Performed by: STUDENT IN AN ORGANIZED HEALTH CARE EDUCATION/TRAINING PROGRAM

## 2024-09-06 PROCEDURE — 86431 RHEUMATOID FACTOR QUANT: CPT | Performed by: STUDENT IN AN ORGANIZED HEALTH CARE EDUCATION/TRAINING PROGRAM

## 2024-09-06 PROCEDURE — 73610 X-RAY EXAM OF ANKLE: CPT | Mod: TC | Performed by: RADIOLOGY

## 2024-09-06 PROCEDURE — 85652 RBC SED RATE AUTOMATED: CPT | Performed by: STUDENT IN AN ORGANIZED HEALTH CARE EDUCATION/TRAINING PROGRAM

## 2024-09-06 PROCEDURE — 80061 LIPID PANEL: CPT | Performed by: STUDENT IN AN ORGANIZED HEALTH CARE EDUCATION/TRAINING PROGRAM

## 2024-09-06 PROCEDURE — 84550 ASSAY OF BLOOD/URIC ACID: CPT | Performed by: STUDENT IN AN ORGANIZED HEALTH CARE EDUCATION/TRAINING PROGRAM

## 2024-09-06 PROCEDURE — 86803 HEPATITIS C AB TEST: CPT | Performed by: STUDENT IN AN ORGANIZED HEALTH CARE EDUCATION/TRAINING PROGRAM

## 2024-09-06 PROCEDURE — 36415 COLL VENOUS BLD VENIPUNCTURE: CPT | Performed by: STUDENT IN AN ORGANIZED HEALTH CARE EDUCATION/TRAINING PROGRAM

## 2024-09-06 PROCEDURE — 86038 ANTINUCLEAR ANTIBODIES: CPT | Performed by: STUDENT IN AN ORGANIZED HEALTH CARE EDUCATION/TRAINING PROGRAM

## 2024-09-06 PROCEDURE — 87389 HIV-1 AG W/HIV-1&-2 AB AG IA: CPT | Performed by: STUDENT IN AN ORGANIZED HEALTH CARE EDUCATION/TRAINING PROGRAM

## 2024-09-06 PROCEDURE — 99214 OFFICE O/P EST MOD 30 MIN: CPT | Mod: 25 | Performed by: STUDENT IN AN ORGANIZED HEALTH CARE EDUCATION/TRAINING PROGRAM

## 2024-09-06 PROCEDURE — 86140 C-REACTIVE PROTEIN: CPT | Performed by: STUDENT IN AN ORGANIZED HEALTH CARE EDUCATION/TRAINING PROGRAM

## 2024-09-06 RX ORDER — BENZOCAINE/MENTHOL 6 MG-10 MG
LOZENGE MUCOUS MEMBRANE 2 TIMES DAILY
Qty: 30 G | Refills: 1 | Status: SHIPPED | OUTPATIENT
Start: 2024-09-06

## 2024-09-06 ASSESSMENT — PAIN SCALES - GENERAL: PAINLEVEL: MODERATE PAIN (4)

## 2024-09-06 NOTE — PROGRESS NOTES
Preventive Care Visit  [facility]  Jaquelin Valdez MD, Family Medicine  Sep 6, 2024      Assessment & Plan     Routine general medical examination at a health care facility  Patient is a pleasant 48-year-old gentleman who presents today for annual visit.    Dermatitis  While here, he does have an area of dermatitis on the posterior left leg.  Will trial over-the-counter hydrocortisone first.  Not improving in the next few days, consider P3 steroid.  - hydrocortisone (CORTAID) 1 % external cream  Dispense: 30 g; Refill: 1    Pain in joint, ankle and foot, right  Patient has been having intermittent flares of right ankle pain ongoing for many years.  Unknown initial injury.  On exam today, does have some significant swelling of the medial malleolus on the right ankle.  He states that symptoms are improved now compared to a recent flare.  No erythema or warmth.  No known injury.  Has had previous inflammatory markers in the past that have been normal, however we did obtain the below workup today for further evaluation.  We opted for an x-ray today which shows soft tissue swelling but no bony injury.  Will offer MRI to the patient for further evaluation of this area given the unclear etiology of his symptoms.  In the interim, can continue to use Advil if needed.  - Erythrocyte sedimentation rate auto  - CRP inflammation  - CBC with platelets and differential  - Comprehensive metabolic panel  - Uric acid  - Anti Nuclear Sangeetha IgG by IFA with Reflex  - Rheumatoid factor  - XR Ankle Right G/E 3 Views  - Rheumatoid factor  - Anti Nuclear Sangeetha IgG by IFA with Reflex  - Uric acid  - Comprehensive metabolic panel  - CBC with platelets and differential  - CRP inflammation  - Erythrocyte sedimentation rate auto    Nicotine dependence, uncomplicated, unspecified nicotine product type  Patient continues to use nicotine, 2, interested in discontinuing.  We will trial nicotine lozenges.  Could always add  "bupropion in the future (avoid Chantix), and/or patches.  - nicotine (NICORETTE) 4 MG lozenge  Dispense: 108 lozenge; Refill: 5    Class 2 severe obesity due to excess calories with serious comorbidity and body mass index (BMI) of 38.0 to 38.9 in adult (H)  Noted on exam today, did not discuss in great detail.  Healthy lifestyle recommendations and after visit summary.    Screening for lipid disorders  - Lipid panel reflex to direct LDL Non-fasting  - Lipid panel reflex to direct LDL Non-fasting    Screening for diabetes mellitus  - Hemoglobin A1c  - Hemoglobin A1c    Encounter for screening for HIV  - HIV Antigen Antibody Combo  - HIV Antigen Antibody Combo    Encounter for hepatitis C screening test for low risk patient  - Hepatitis C Screen Reflex to HCV RNA Quant and Genotype  - Hepatitis C Screen Reflex to HCV RNA Quant and Genotype    Screen for colon cancer  - Colonoscopy Screening  Referral      Patient has been advised of split billing requirements and indicates understanding: Yes      BMI  Estimated body mass index is 38.09 kg/m  as calculated from the following:    Height as of this encounter: 1.676 m (5' 6\").    Weight as of this encounter: 107 kg (236 lb).     Counseling  Appropriate preventive services were addressed with this patient via screening, questionnaire, or discussion as appropriate for fall prevention, nutrition, physical activity, Tobacco-use cessation, social engagement, weight loss and cognition.  Checklist reviewing preventive services available has been given to the patient.  Reviewed patient's diet, addressing concerns and/or questions.   He is at risk for lack of exercise and has been provided with information to increase physical activity for the benefit of his well-being.   The patient was instructed to see the dentist every 6 months.   The patient reports drinking more than 3 alcoholic drinks per day and/or more than 7 drhnks per week. The patient was counseled and given " "information about possible harmful effects of excessive alcohol intake.      Subjective   Paco is a 48 year old, presenting for the following:  Physical and Foot Pain (Right foot/ankle, off and on for years getting worst, \" feels freezing\" )        9/6/2024    10:30 AM   Additional Questions   Roomed by Esperanza HEALY   Accompanied by Wife        Health Care Directive  Patient does not have a Health Care Directive or Living Will: Discussed advance care planning with patient; information given to patient to review.    HPI  Foot/ankle pain   Right ankle will feel freezing, sensitivity to light touch and movement.   Has to externally rotate at the hip to walk sometimes because of the pain.   All of a sudden comes on, be there for anywhere from 2-3 days to 4-5 days, gets really bad for a night or twothen all of a sudden wakes up and it is better.   No known injury there.   Was swollen pretty good.   Happens couple times a year.   Felt better when it was wrapped.   A little purple looking at times.     Sensation in the toes normal.       Itchy rash back of left leg.   Noticed about 3 weeks ago.   Were at a bbq pool party. Started itching the leg.   Wears jeans at work usually          9/6/2024   General Health   How would you rate your overall physical health? (!) FAIR   Feel stress (tense, anxious, or unable to sleep) To some extent      (!) STRESS CONCERN      9/6/2024   Nutrition   Three or more servings of calcium each day? Yes   Diet: Regular (no restrictions)   How many servings of fruit and vegetables per day? (!) 0-1   How many sweetened beverages each day? 0-1            9/6/2024   Exercise   Days per week of moderate/strenous exercise 3 days            9/6/2024   Social Factors   Frequency of gathering with friends or relatives Once a week   Worry food won't last until get money to buy more No   Food not last or not have enough money for food? No   Do you have housing? (Housing is defined as stable permanent " housing and does not include staying ouside in a car, in a tent, in an abandoned building, in an overnight shelter, or couch-surfing.) Yes   Are you worried about losing your housing? No   Lack of transportation? No   Unable to get utilities (heat,electricity)? No            9/6/2024   Dental   Dentist two times every year? (!) NO            9/6/2024   TB Screening   Were you born outside of the [place]? No            Today's PHQ-2 Score:       9/6/2024    10:13 AM   PHQ-2 ( 1999 [employer])   Q1: Little interest or pleasure in doing things 0   Q2: Feeling down, depressed or hopeless 0   PHQ-2 Score 0   Q1: Little interest or pleasure in doing things Not at all   Q2: Feeling down, depressed or hopeless Not at all   PHQ-2 Score 0           9/6/2024   Substance Use   Alcohol more than 3/day or more than 7/wk Yes   How often do you have a drink containing alcohol 2 to 3 times a week   How many alcohol drinks on typical day 3 or 4   How often do you have 5+ drinks at one occasion Weekly   Audit 2/3 Score 4   How often not able to stop drinking once started Less than monthly   How often failed to do what normally expected Less than monthly   How often needed first drink in am after a heavy drinking session Never   How often feeling of guilt or remorse after drinking Never   How often unable to remember what happened the night before Never   Have you or someone else been injured because of your drinking No   Has anyone been concerned or suggested you cut down on drinking No   TOTAL SCORE - AUDIT 9   Do you use any other substances recreationally? No        Social History     Tobacco Use    Smoking status: Former     Current packs/day: 0.50     Average packs/day: 0.5 packs/day for 15.0 years (7.5 ttl pk-yrs)     Types: Cigarettes    Smokeless tobacco: Current     Types: Chew    Tobacco comments:     Chews 2 tins a week   Substance Use Topics    Alcohol use: Yes     Comment: 10 drinks per week    Drug use: No           9/6/2024  "  STI Screening   New sexual partner(s) since last STI/HIV test? No      ASCVD Risk   The 10-year ASCVD risk score (Ezequiel BLUE, et al., 2019) is: 3.1%    Values used to calculate the score:      Age: 48 years      Sex: Male      Is Non- : No      Diabetic: No      Tobacco smoker: No      Systolic Blood Pressure: 134 mmHg      Is BP treated: No      HDL Cholesterol: 47 mg/dL      Total Cholesterol: 194 mg/dL       Reviewed and updated as needed this visit by Provider                        Review of Systems  Constitutional, HEENT, cardiovascular, pulmonary, gi and gu systems are negative, except as otherwise noted.     Objective    Exam  /84 (BP Location: Right arm, Patient Position: Sitting, Cuff Size: Adult Large)   Pulse 74   Resp 18   Ht 1.676 m (5' 6\")   Wt 107 kg (236 lb)   SpO2 98%   BMI 38.09 kg/m     Estimated body mass index is 38.09 kg/m  as calculated from the following:    Height as of this encounter: 1.676 m (5' 6\").    Weight as of this encounter: 107 kg (236 lb).    Physical Exam  GENERAL: alert and no distress  EYES: Eyes grossly normal to inspection, PERRL and conjunctivae and sclerae normal  HENT: ear canals and TM's normal, nose and mouth without ulcers or lesions  NECK: no adenopathy, no asymmetry, masses, or scars  RESP: lungs clear to auscultation - no rales, rhonchi or wheezes  CV: regular rate and rhythm, normal S1 S2, no S3 or S4, no murmur, click or rub, no peripheral edema  ABDOMEN: soft, nontender, no hepatosplenomegaly, no masses and bowel sounds normal  MS: no gross musculoskeletal defects noted, edema of the medial malleolus on the right ankle, mild surrounding tenderness.  SKIN: no suspicious lesions or rashes  NEURO: Normal strength and tone, mentation intact and speech normal  PSYCH: mentation appears normal, affect normal/bright        Signed Electronically by: Jaquelin Valdez MD    "

## 2024-09-07 LAB
HCV AB SERPL QL IA: NONREACTIVE
HIV 1+2 AB+HIV1 P24 AG SERPL QL IA: NONREACTIVE
RHEUMATOID FACT SERPL-ACNC: <10 IU/ML

## 2024-09-07 NOTE — PATIENT INSTRUCTIONS
Patient Education   Preventive Care Advice   This is general advice given by our system to help you stay healthy. However, your care team may have specific advice just for you. Please talk to your care team about your preventive care needs.  Nutrition  Eat 5 or more servings of fruits and vegetables each day.  Try wheat bread, brown rice and whole grain pasta (instead of white bread, rice, and pasta).  Get enough calcium and vitamin D. Check the label on foods and aim for 100% of the RDA (recommended daily allowance).  Lifestyle  Exercise at least 150 minutes each week  (30 minutes a day, 5 days a week).  Do muscle strengthening activities 2 days a week. These help control your weight and prevent disease.  No smoking.  Wear sunscreen to prevent skin cancer.  Have a dental exam and cleaning every 6 months.  Yearly exams  See your health care team every year to talk about:  Any changes in your health.  Any medicines your care team has prescribed.  Preventive care, family planning, and ways to prevent chronic diseases.  Shots (vaccines)   HPV shots (up to age 26), if you've never had them before.  Hepatitis B shots (up to age 59), if you've never had them before.  COVID-19 shot: Get this shot when it's due.  Flu shot: Get a flu shot every year.  Tetanus shot: Get a tetanus shot every 10 years.  Pneumococcal, hepatitis A, and RSV shots: Ask your care team if you need these based on your risk.  Shingles shot (for age 50 and up)  General health tests  Diabetes screening:  Starting at age 35, Get screened for diabetes at least every 3 years.  If you are younger than age 35, ask your care team if you should be screened for diabetes.  Cholesterol test: At age 39, start having a cholesterol test every 5 years, or more often if advised.  Bone density scan (DEXA): At age 50, ask your care team if you should have this scan for osteoporosis (brittle bones).  Hepatitis C: Get tested at least once in your life.  STIs (sexually  transmitted infections)  Before age 24: Ask your care team if you should be screened for STIs.  After age 24: Get screened for STIs if you're at risk. You are at risk for STIs (including HIV) if:  You are sexually active with more than one person.  You don't use condoms every time.  You or a partner was diagnosed with a sexually transmitted infection.  If you are at risk for HIV, ask about PrEP medicine to prevent HIV.  Get tested for HIV at least once in your life, whether you are at risk for HIV or not.  Cancer screening tests  Cervical cancer screening: If you have a cervix, begin getting regular cervical cancer screening tests starting at age 21.  Breast cancer scan (mammogram): If you've ever had breasts, begin having regular mammograms starting at age 40. This is a scan to check for breast cancer.  Colon cancer screening: It is important to start screening for colon cancer at age 45.  Have a colonoscopy test every 10 years (or more often if you're at risk) Or, ask your provider about stool tests like a FIT test every year or Cologuard test every 3 years.  To learn more about your testing options, visit:   .  For help making a decision, visit:   https://bit.ly/oy21670.  Prostate cancer screening test: If you have a prostate, ask your care team if a prostate cancer screening test (PSA) at age 55 is right for you.  Lung cancer screening: If you are a current or former smoker ages 50 to 80, ask your care team if ongoing lung cancer screenings are right for you.  For informational purposes only. Not to replace the advice of your health care provider. Copyright   2023 [facility]. All rights reserved. Clinically reviewed by the [facility]. [employer] 000002 - REV 01/24.  9 Ways to Cut Back on Drinking  Maybe you've found yourself drinking more alcohol than you'd prefer. If you want to cut back, here are some ideas to try.    Think before you drink.  Do you really want a drink,  "or is it just a habit? If you're used to having a drink at a certain time, try doing something else then.     Look for substitutes.  Find some no-alcohol drinks that you enjoy, like flavored seltzer water, tea with honey, or tonic with a slice of lime. Or try alcohol-free beer or \"virgin\" cocktails (without the alcohol).     Drink more water.  Use water to quench your thirst. Drink a glass of water before you have any alcohol. Have another glass along with every drink or between drinks.     Shrink your drink.  For example, have a bottle of beer instead of a pint. Use a smaller glass for wine. Choose drinks with lower alcohol content (ABV%). Or use less liquor and more mixer in cocktails.     Slow down.  It's easy to drink quickly and without thinking about it. Pay attention, and make each drink last longer.     Do the math.  Total up how much you spend on alcohol each month. How much is that a year? If you cut back, what could you do with the money you save?     Take a break.  Choose a day or two each week when you won't drink at all. Notice how you feel on those days, physically and emotionally. How did you sleep? Do you feel better? Over time, add more break days.     Count calories.  Would you like to lose some weight? For some people that's a good motivator for cutting back. Figure out how many calories are in each drink. How many does that add up to in a day? In a week? In a month?     Practice saying no.  Be ready when someone offers you a drink. Try: \"Thanks, I've had enough.\" Or \"Thanks, but I'm cutting back.\" Or \"No, thanks. I feel better when I drink less.\"   Current as of: November 15, 2023  Content Version: 14.1 2006-2024 [facility].   Care instructions adapted under license by your healthcare professional. If you have questions about a medical condition or this instruction, always ask your healthcare professional. [facility] disclaims any warranty or liability for your use " of this information.

## 2024-09-09 LAB — ANA SER QL IF: NEGATIVE

## 2025-08-07 ENCOUNTER — PATIENT OUTREACH (OUTPATIENT)
Dept: CARE COORDINATION | Facility: CLINIC | Age: 50
End: 2025-08-07
Payer: COMMERCIAL

## 2025-08-21 ENCOUNTER — PATIENT OUTREACH (OUTPATIENT)
Dept: CARE COORDINATION | Facility: CLINIC | Age: 50
End: 2025-08-21
Payer: COMMERCIAL